# Patient Record
Sex: MALE | Race: WHITE
[De-identification: names, ages, dates, MRNs, and addresses within clinical notes are randomized per-mention and may not be internally consistent; named-entity substitution may affect disease eponyms.]

---

## 2019-08-12 ENCOUNTER — HOSPITAL ENCOUNTER (EMERGENCY)
Dept: HOSPITAL 97 - ER | Age: 29
Discharge: HOME | End: 2019-08-12
Payer: SELF-PAY

## 2019-08-12 DIAGNOSIS — M54.5: ICD-10-CM

## 2019-08-12 DIAGNOSIS — F17.210: ICD-10-CM

## 2019-08-12 DIAGNOSIS — M54.6: Primary | ICD-10-CM

## 2019-08-12 LAB — UA COMPLETE W REFLEX CULTURE PNL UR: (no result)

## 2019-08-12 PROCEDURE — 81015 MICROSCOPIC EXAM OF URINE: CPT

## 2019-08-12 PROCEDURE — 96372 THER/PROPH/DIAG INJ SC/IM: CPT

## 2019-08-12 PROCEDURE — 81003 URINALYSIS AUTO W/O SCOPE: CPT

## 2019-08-12 PROCEDURE — 71046 X-RAY EXAM CHEST 2 VIEWS: CPT

## 2019-08-12 PROCEDURE — 99284 EMERGENCY DEPT VISIT MOD MDM: CPT

## 2019-08-12 NOTE — ER
Nurse's Notes                                                                                     

 [facility]                                                                 

Name: Burt Alex                                                                            

Age: 28 yrs                                                                                       

Sex: Male                                                                                         

: 1990                                                                                   

MRN: [id number]                                                                                   

Arrival Date: 2019                                                                          

Time: 11:45                                                                                       

Account#: [id number]                                                                            

Bed 24                                                                                            

Private MD:                                                                                       

Diagnosis: Pain in thoracic spine                                                                 

                                                                                                  

Presentation:                                                                                     

                                                                                             

11:56 Presenting complaint: Patient states: i have this sever back pain for 5 days now,       hj  

      denies trauma to the area, pain radiates to the leg; pain is 8/10;. Transition of care:     

      patient was not received from another setting of care. Onset of symptoms was 2019. Risk Assessment: Do you want to hurt yourself or someone else? Patient reports no     

      desire to harm self or others. Initial Sepsis Screen: Does the patient meet any 2           

      criteria? No. Patient's initial sepsis screen is negative. Does the patient have a          

      suspected source of infection? No. Patient's initial sepsis screen is negative. Care        

      prior to arrival: None.                                                                     

11:56 Method Of Arrival: Ambulatory                                                             

11:56 Acuity: YULI 4                                                                           hj  

                                                                                                  

Historical:                                                                                       

- Allergies:                                                                                      

11:58 No Known Allergies;                                                                     hj  

- PMHx:                                                                                           

11:58 None;                                                                                   hj  

- PSHx:                                                                                           

11:58 None;                                                                                   hj  

                                                                                                  

- Immunization history:: Adult Immunizations up to date.                                          

- Social history:: Smoking status: Patient uses tobacco products, smokes one-half pack            

  cigarettes per day.                                                                             

- Ebola Screening: : Patient negative for fever greater than or equal to 101.5 degrees            

  Fahrenheit, and additional compatible Ebola Virus Disease symptoms Patient denies               

  exposure to infectious person Patient denies travel to an Ebola-affected area in the            

  21 days before illness onset No symptoms or risks identified at this time.                      

                                                                                                  

                                                                                                  

Screenin:10 Abuse screen: Denies threats or abuse. Denies injuries from another. Nutritional        ca1 

      screening: No deficits noted. Tuberculosis screening: No symptoms or risk factors           

      identified. Fall Risk None identified.                                                      

                                                                                                  

Assessment:                                                                                       

12:10 General: Appears in no apparent distress. comfortable, Behavior is calm, cooperative,   ca1 

      appropriate for age. Pain: Complains of pain in lumbar area and thoracic area Pain          

      radiates to right leg and left leg Pain currently is 8 out of 10 on a pain scale. Pain      

      began 5 days ago. Neuro: Level of Consciousness is awake, alert, obeys commands,            

      Oriented to person, place, time, situation. Cardiovascular: Heart tones S1 S2 present       

      Capillary refill < 3 seconds Patient's skin is warm and dry. Respiratory: Airway is         

      patent Respiratory effort is even, unlabored, Respiratory pattern is regular,               

      symmetrical, Breath sounds are clear bilaterally. GI: Abdomen is round non-distended,       

      Bowel sounds present X 4 quads. Abd is soft and non tender X 4 quads. : No deficits       

      noted. No signs and/or symptoms were reported regarding the genitourinary system. EENT:     

      No deficits noted. No signs and/or symptoms were reported regarding the EENT system.        

      Derm: Skin is intact, is healthy with good turgor, Skin is pink, warm \T\ dry.              

      Musculoskeletal: Circulation, motion, and sensation intact. Capillary refill < 3            

      seconds, Range of motion: intact in all extremities.                                        

13:10 Reassessment: Patient appears in no apparent distress at this time. Patient and/or      ca1 

      family updated on plan of care and expected duration. Pain level reassessed. Patient is     

      alert, oriented x 3, equal unlabored respirations, skin warm/dry/pink.                      

                                                                                                  

Vital Signs:                                                                                      

11:58  / 81; Pulse 82; Resp 18; Temp 97.8(TE); Pulse Ox 100% on R/A; Weight 99.79 kg;   hj  

      Height 6 ft. 2 in. (187.96 cm); Pain 8/10;                                                  

13:10  / 81; Pulse 79; Resp 16 S; Temp 98(O); Pulse Ox 100% on R/A;                     ca1 

11:58 Body Mass Index 28.25 (99.79 kg, 187.96 cm)                                               

                                                                                                  

ED Course:                                                                                        

11:45 Patient arrived in ED.                                                                  rg4 

11:48 Argentina Armenta FNP-C is PHCP.                                                        snw 

11:48 Fortino Pickens MD is Attending Physician.                                                snw 

11:57 Triage completed.                                                                       hj  

11:58 Arm band placed on left wrist.                                                          hj  

12:01 Nica Cervantes, RN is Primary Nurse.                                                      ca1 

12:10 Patient has correct armband on for positive identification. Bed in low position. Call   ca1 

      light in reach. Side rails up X 1.                                                          

12:10 No provider procedures requiring assistance completed.                                  ca1 

12:12 Urine collected: clean catch specimen, clear, mely colored.                            jp3 

12:45 X-ray completed. Patient tolerated procedure well. Patient moved back from radiology.   mh1 

12:48 Chest Pa And Lat (2 Views) XRAY In Process Unspecified.                                 EDMS

13:16 Patient did not have IV access during this emergency room visit.                        ca1 

                                                                                                  

Administered Medications:                                                                         

12:17 Drug: Phenergan 25 mg Route: PO;                                                        ca1 

12:30 Drug: TORadol 30 mg Route: IM; Site: right deltoid;                                     ca1 

                                                                                                  

                                                                                                  

Outcome:                                                                                          

12:54 Discharge ordered by MD.                                                                [id number] 

13:16 Discharged to home ambulatory, with family.                                             ca1 

13:16 Condition: stable                                                                           

13:16 Discharge instructions given to patient, Instructed on discharge instructions, follow       

      up and referral plans. medication usage, Demonstrated understanding of instructions,        

      follow-up care, medications, Prescriptions given X 2.                                       

13:17 Patient left the ED.                                                                    ca1 

                                                                                                  

Signatures:                                                                                       

Dispatcher [employer]                           EDMS                                                 

Argentina Armenta, TARAHP-C                 FNP-Csnw                                                  

Ayanna Steiner                               1                                                  

Wolfgang Vásquez, RN                      RN   Monica Puri                                 rg4                                                  

Carmelo Tinoco                              3                                                  

Nica Cervantes RN                        RN   ca1                                                  

                                                                                                  

Corrections: (The following items were deleted from the chart)                                    

12:00 11:58 Pulse 82bpm; Resp 18bpm; Pulse Ox 100% RA; Temp 97.8F Temporal; 99.79 kg; Height  hj  

      6 ft. 2 in.; BMI: 28.2; Pain 8/10; hj                                                       

                                                                                                  

**************************************************************************************************

## 2019-08-12 NOTE — RAD REPORT
EXAM DESCRIPTION:  RAD - Chest Pa And Lat (2 Views) - 8/12/2019 12:46 pm

 

CLINICAL HISTORY:  Wheezing, cough, chest pain, back pain

 

COMPARISON:  None.

 

TECHNIQUE:  PA and lateral views of the chest were obtained.

 

FINDINGS:  The lungs are clear.   Heart size is normal and central vasculature is within normal limit
s.  No pleural effusion or pneumothorax seen.  No acute bony finding noted.  No aortic abnormality.  
No free air in the diaphragm.

 

IMPRESSION:  No acute cardiopulmonary process.

## 2019-08-12 NOTE — EDPHYS
Physician Documentation                                                                           

 [facility]                                                                 

Name: Burt Alex                                                                            

Age: 28 yrs                                                                                       

Sex: Male                                                                                         

: 1990                                                                                   

MRN: [id number]                                                                                   

Arrival Date: 2019                                                                          

Time: 11:45                                                                                       

Account#: [id number]                                                                            

Bed 24                                                                                            

Private MD:                                                                                       

ED Physician Fortino Pickens                                                                         

HPI:                                                                                              

                                                                                             

12:09 This 28 yrs old  Male presents to ER via Ambulatory with complaints of Back    snw 

      Pain.                                                                                       

12:09 The patient presents with pain that is acute, with no known mechanism of injury. The    snw 

      symptoms are located in the thoracic spine and lumbar spine. Onset: The                     

      symptoms/episode began/occurred 5 day(s) ago. The pain radiates to the right gluteus        

      dayana. Associated signs and symptoms: The patient has no apparent associated signs or     

      symptoms. The problem was sustained from unknown cause. Severity of symptoms: At their      

      worst the symptoms were mild. The patient has not experienced similar symptoms in the       

      past, but family has similar symptoms, Mom and Sibling dx with spinal stenosis and pt       

      concerned for this. The patient has not recently seen a physician.                          

                                                                                                  

Historical:                                                                                       

- Allergies:                                                                                      

11:58 No Known Allergies;                                                                     hj  

- PMHx:                                                                                           

11:58 None;                                                                                   hj  

- PSHx:                                                                                           

11:58 None;                                                                                   hj  

                                                                                                  

- Immunization history:: Adult Immunizations up to date.                                          

- Social history:: Smoking status: Patient uses tobacco products, smokes one-half pack            

  cigarettes per day.                                                                             

- Ebola Screening: : Patient negative for fever greater than or equal to 101.5 degrees            

  Fahrenheit, and additional compatible Ebola Virus Disease symptoms Patient denies               

  exposure to infectious person Patient denies travel to an Ebola-affected area in the            

  21 days before illness onset No symptoms or risks identified at this time.                      

                                                                                                  

                                                                                                  

ROS:                                                                                              

12:09 Constitutional: Negative for fever, chills, and weight loss, Eyes: Negative for injury, snw 

      pain, redness, and discharge, ENT: Negative for injury, pain, and discharge, Neck:          

      Negative for injury, pain, and swelling, Cardiovascular: Negative for chest pain,           

      palpitations, and edema, Respiratory: Negative for shortness of breath, cough,              

      wheezing, and pleuritic chest pain, Abdomen/GI: Negative for abdominal pain, nausea,        

      vomiting, diarrhea, and constipation, : Negative for injury, bleeding, discharge, and     

      swelling, MS/Extremity: Negative for injury and deformity, Skin: Negative for injury,       

      rash, and discoloration, Neuro: Negative for headache, weakness, numbness, tingling,        

      and seizure.                                                                                

12:09 Back: Positive for pain at rest, pain with movement, of the thoracic area and lumbar        

      area.                                                                                       

                                                                                                  

Exam:                                                                                             

12:07 Head/Face:  Normocephalic, atraumatic. Eyes:  Pupils equal round and reactive to light, snw 

      extra-ocular motions intact.  Lids and lashes normal.  Conjunctiva and sclera are           

      non-icteric and not injected.  Cornea within normal limits.  Periorbital areas with no      

      swelling, redness, or edema. ENT:  Nares patent. No nasal discharge, no septal              

      abnormalities noted.  Tympanic membranes are normal and external auditory canals are        

      clear.  Oropharynx with no redness, swelling, or masses, exudates, or evidence of           

      obstruction, uvula midline.  Mucous membranes moist. Neck:  Trachea midline, no             

      thyromegaly or masses palpated, and no cervical lymphadenopathy.  Supple, full range of     

      motion without nuchal rigidity, or vertebral point tenderness.  No Meningismus.             

      Chest/axilla:  Normal chest wall appearance and motion.  Nontender with no deformity.       

      No lesions are appreciated. Cardiovascular:  Regular rate and rhythm with a normal S1       

      and S2.  No gallops, murmurs, or rubs.  Normal PMI, no JVD.  No pulse deficits.             

      Abdomen/GI:  Soft, non-tender, with normal bowel sounds.  No distension or tympany.  No     

      guarding or rebound.  No evidence of tenderness throughout. Skin:  Warm, dry with           

      normal turgor.  Normal color with no rashes, no lesions, and no evidence of cellulitis.     

      MS/ Extremity:  Pulses equal, no cyanosis.  Neurovascular intact.  Full, normal range       

      of motion. Neuro:  Awake and alert, GCS 15, oriented to person, place, time, and            

      situation.  Cranial nerves II-XII grossly intact.  Motor strength 5/5 in all                

      extremities.  Sensory grossly intact.  Cerebellar exam normal.  Normal gait. Psych:         

      Awake, alert, with orientation to person, place and time.  Behavior, mood, and affect       

      are within normal limits.                                                                   

12:07 Constitutional: The patient appears alert, awake, anxious, uncomfortable.                   

12:07 Respiratory: the patient does not display signs of respiratory distress,  Respirations:     

      shallow respirations, Breath sounds: wheezing: that is moderate, right greater than         

      left.                                                                                       

12:07 Back: pain, that is moderate, of the  thoracic area and lumbar area, ROM is painful,        

      CVA tenderness, is absent.                                                                  

                                                                                                  

Vital Signs:                                                                                      

11:58  / 81; Pulse 82; Resp 18; Temp 97.8(TE); Pulse Ox 100% on R/A; Weight 99.79 kg;   hj  

      Height 6 ft. 2 in. (187.96 cm); Pain 8/10;                                                  

13:10  / 81; Pulse 79; Resp 16 S; Temp 98(O); Pulse Ox 100% on R/A;                     ca1 

11:58 Body Mass Index 28.25 (99.79 kg, 187.96 cm)                                             hj  

                                                                                                  

MDM:                                                                                              

12:02 Patient medically screened.                                                             snw 

12:08 Data reviewed: vital signs, nurses notes. Data interpreted: Pulse oximetry: on room air snw 

      is 100 %. Interpretation: normal. Counseling: I had a detailed discussion with the          

      patient and/or guardian regarding: the historical points, exam findings, and any            

      diagnostic results supporting the discharge/admit diagnosis, the presence of at least       

      one elevated blood pressure reading (>120/80) during this emergency department visit.       

                                                                                                  

                                                                                             

12:01 Order name: Urine Microscopic Only; Complete Time: 12:36                                snw 

                                                                                             

12:23 Order name: Urine Dipstick--Ancillary (enter results)                                   bd  

                                                                                             

12:01 Order name: Urine Dipstick-Ancillary (obtain specimen); Complete Time: 12:17            snw 

                                                                                             

12:07 Order name: Chest Pa And Lat (2 Views) XRAY; Complete Time: 12:53                       snw 

                                                                                                  

Administered Medications:                                                                         

12:17 Drug: Phenergan 25 mg Route: PO;                                                        ca1 

12:30 Drug: TORadol 30 mg Route: IM; Site: right deltoid;                                     ca1 

                                                                                                  

                                                                                                  

Disposition:                                                                                      

15:22 Co-signature as Attending Physician, Fortino Pickens MD.                                    rn  

                                                                                                  

Disposition:                                                                                      

19 12:54 Discharged to Home. Impression: Pain in thoracic spine.                            

- Condition is Stable.                                                                            

- Discharge Instructions: Back Pain, Adult, Hypertension, Steps to Quit Smoking,                  

  Smoking Hazards, Thoracic Strain.                                                               

- Prescriptions for Mobic 7.5 mg Oral Tablet - take 1 tablet by ORAL route once daily             

  take with food; 20 tablet. promethazine 25 mg Oral Tablet - take 1 tablet by ORAL               

  route every 6 hours As needed; 20 tablet.                                                       

- Medication Reconciliation Form, Thank You Letter, Antibiotic Education, Prescription            

  Opioid Use form.                                                                                

- Follow up: Private Physician; When: 1 week; Reason: Recheck today's complaints,                 

  Continuance of care, Re-evaluation by your physician. Follow up: Emergency                      

  Department; When: As needed; Reason: Worsening of condition.                                    

                                                                                                  

                                                                                                  

                                                                                                  

Signatures:                                                                                       

Dispatcher [employer]                           EDMS                                                 

Kathi Argnetina, FNP-C                 FNP-Csnw                                                  

Fortino Pickens MD MD rn Joaquin, Henry, RN RN                                                      

iNca Cervantes RN RN   ca1                                                  

                                                                                                  

Corrections: (The following items were deleted from the chart)                                    

13:17 12:54 2019 12:54 Discharged to Home. Impression: Pain in thoracic spine.          ca1 

      Condition is Stable. Forms are Medication Reconciliation Form, Thank You Letter,            

      Antibiotic Education, Prescription Opioid Use. Follow up: Private Physician; When: 1        

      week; Reason: Recheck today's complaints, Continuance of care, Re-evaluation by your        

      physician. Follow up: Emergency Department; When: As needed; Reason: Worsening of           

      condition. snw                                                                              

                                                                                                  

**************************************************************************************************

## 2019-09-12 ENCOUNTER — HOSPITAL ENCOUNTER (EMERGENCY)
Dept: HOSPITAL 97 - ER | Age: 29
Discharge: HOME | End: 2019-09-12
Payer: SELF-PAY

## 2019-09-12 VITALS — DIASTOLIC BLOOD PRESSURE: 93 MMHG | SYSTOLIC BLOOD PRESSURE: 146 MMHG

## 2019-09-12 VITALS — OXYGEN SATURATION: 97 % | TEMPERATURE: 97.4 F

## 2019-09-12 DIAGNOSIS — R60.9: Primary | ICD-10-CM

## 2019-09-12 DIAGNOSIS — F17.210: ICD-10-CM

## 2019-09-12 PROCEDURE — 99283 EMERGENCY DEPT VISIT LOW MDM: CPT

## 2019-09-12 NOTE — ER
Nurse's Notes                                                                                     

 [facility]                                                                 

Name: Burt Alex                                                                            

Age: 28 yrs                                                                                       

Sex: Male                                                                                         

: 1990                                                                                   

MRN: [id number]                                                                                   

Arrival Date: 2019                                                                          

Time: 18:56                                                                                       

Account#: [id number]                                                                            

Bed 18                                                                                            

Private MD:                                                                                       

Diagnosis: Pedal Edema                                                                            

                                                                                                  

Presentation:                                                                                     

                                                                                             

19:10 Presenting complaint: Patient states: bilateral foot swelling since last night. pt      ak1 

      stated he has had this happen before and he rest, elevates and ice them. pt told his        

      boss, boss stated he must have doctor note to take days off and return to work.             

      Transition of care: patient was not received from another setting of care. Onset of         

      symptoms was 2019. Risk Assessment: Do you want to hurt yourself or           

      someone else? Patient reports no desire to harm self or others. Initial Sepsis Screen:      

      Does the patient meet any 2 criteria? No. Patient's initial sepsis screen is negative.      

      Does the patient have a suspected source of infection?. Care prior to arrival: None.        

19:10 Acuity: YULI 3                                                                           ak1 

19:10 Method Of Arrival: Ambulatory                                                           ak1 

                                                                                                  

Triage Assessment:                                                                                

19:12 General: Appears in no apparent distress. Behavior is calm, cooperative.                ak1 

                                                                                                  

Historical:                                                                                       

- Allergies:                                                                                      

19:12 No Known Allergies;                                                                     ak1 

- Home Meds:                                                                                      

19:12 None [Active];                                                                          ak1 

- PMHx:                                                                                           

19:12 None;                                                                                   ak1 

- PSHx:                                                                                           

19:12 None;                                                                                   ak1 

                                                                                                  

- Immunization history:: Adult Immunizations unknown.                                             

- Social history:: Smoking status: Patient uses tobacco products, smokes two packs                

  cigarettes per day.                                                                             

- Ebola Screening: : No symptoms or risks identified at this time.                                

                                                                                                  

                                                                                                  

Screenin:20 Abuse screen: Denies threats or abuse. Denies injuries from another. Nutritional        aa1 

      screening: No deficits noted. Tuberculosis screening: No symptoms or risk factors           

      identified. Fall Risk None identified.                                                      

                                                                                                  

Assessment:                                                                                       

19:20 General: Appears in no apparent distress. comfortable, Behavior is calm, cooperative,   aa1 

      appropriate for age. Pain: Complains of pain in right foot and left foot Quality of         

      pain is described as aching. Neuro: Level of Consciousness is awake, alert, obeys           

      commands, Oriented to person, place, time, situation, Moves all extremities. Full           

      function Gait is steady. Cardiovascular: Denies chest pain, palpitations, shortness of      

      breath, Heart tones S1 S2 present Rhythm is regular. Respiratory: Airway is patent          

      Respiratory effort is even, unlabored, Respiratory pattern is regular, symmetrical. GI:     

      No signs and/or symptoms were reported involving the gastrointestinal system. : No        

      signs and/or symptoms were reported regarding the genitourinary system. EENT: No signs      

      and/or symptoms were reported regarding the EENT system. Derm: Skin is intact, is           

      healthy with good turgor, Skin is pink, warm \T\ dry. Musculoskeletal: Circulation,         

      motion, and sensation intact. Capillary refill < 3 seconds, Swelling present in right       

      foot and left foot.                                                                         

20:18 Reassessment: Patient appears in no apparent distress at this time. Patient is alert,   aa1 

      oriented x 3, equal unlabored respirations, skin warm/dry/pink. Discussed d/c \T\ f/u       

      instructions with pt; denies questions or concerns at this time. Ambulatory to lobby        

      with steady gait.                                                                           

                                                                                                  

Vital Signs:                                                                                      

19:10  / 89; Pulse 86; Resp 18; Temp 97.4; Pulse Ox 97% on R/A; Weight 108.86 kg (R);   ak1 

      Height 6 ft. 1 in. (185.42 cm) (R); Pain 2/10;                                              

19:57  / 93; Pulse 74; Resp 18; Pulse Ox 97% on R/A; Pain 2/10;                         aa1 

20:18  / 81; Pulse 75; Resp 18; Temp 97.6; Pulse Ox 98% on R/A; Pain 2/10;              aa1 

19:10 Body Mass Index 31.66 (108.86 kg, 185.42 cm)                                            ak1 

                                                                                                  

ED Course:                                                                                        

18:56 Patient arrived in ED.                                                                  rg4 

19:10 Arm band placed on Patient placed in waiting room, Patient notified of wait time.       ak1 

19:12 Triage completed.                                                                       ak1 

19:20 Patient has correct armband on for positive identification. Placed in gown. Bed in low  aa1 

      position. Call light in reach. Pulse ox on. NIBP on.                                        

19:54 Coni Macdonald RN is Primary Nurse.                                                aa1 

19:55 Nathaniel Mercedes PA is PHCP.                                                               jr8 

19:55 Jonathan Jj MD is Attending Physician.                                             jr8 

20:18 No provider procedures requiring assistance completed. Patient did not have IV access   aa1 

      during this emergency room visit.                                                           

                                                                                                  

Administered Medications:                                                                         

No medications were administered                                                                  

                                                                                                  

                                                                                                  

Outcome:                                                                                          

20:10 Discharge ordered by MD.                                                                monisha 

20:18 Discharged to home ambulatory.                                                          aa1 

20:18 Condition: good                                                                             

20:18 Discharge instructions given to patient, Instructed on discharge instructions, follow       

      up and referral plans. Demonstrated understanding of instructions, follow-up care.          

20:20 Patient left the ED.                                                                    aa1 

                                                                                                  

Signatures:                                                                                       

Coni Macdonald RN                  RN   aa1                                                  

Nathaniel Mercedes PA PA   jr8                                                  

Priyanka Nayak RN                       RN   ak1                                                  

Monica Moore                                 rg4                                                  

                                                                                                  

**************************************************************************************************

## 2019-09-12 NOTE — EDPHYS
Physician Documentation                                                                           

 [facility]                                                                 

Name: Burt Alex                                                                            

Age: 28 yrs                                                                                       

Sex: Male                                                                                         

: 1990                                                                                   

MRN: [id number]                                                                                   

Arrival Date: 2019                                                                          

Time: 18:56                                                                                       

Account#: [id number]                                                                            

Bed 18                                                                                            

Private MD:                                                                                       

ED Physician Jonathan Jj                                                                      

HPI:                                                                                              

                                                                                             

20:07 This 28 yrs old  Male presents to ER via Ambulatory with complaints of Foot    jr8 

      Pain, Swelling Of Feet.                                                                     

20:07 Onset: The symptoms/episode began/occurred acutely, yesterday. Modifying factors: The   jr8 

      symptoms are alleviated by elevating leg, OTC meds, the symptoms are aggravated by          

      weight bearing. Associated signs and symptoms: The patient has no apparent associated       

      signs or symptoms. Severity of symptoms: At their worst the symptoms were mild, in the      

      emergency department the symptoms are unchanged. The patient has experienced similar        

      episodes in the past, a few times. The patient has not recently seen a physician.           

      Patient stated that he works in [employer] and is on his feet all day. Occasionally has       

      swelling in feet. Could not fit into shoes. Tried to go to work to get a couple of days     

      off but required him to have work note. Denies any other symptoms.                          

                                                                                                  

Historical:                                                                                       

- Allergies:                                                                                      

19:12 No Known Allergies;                                                                     ak1 

- Home Meds:                                                                                      

19:12 None [Active];                                                                          ak1 

- PMHx:                                                                                           

19:12 None;                                                                                   ak1 

- PSHx:                                                                                           

19:12 None;                                                                                   ak1 

                                                                                                  

- Immunization history:: Adult Immunizations unknown.                                             

- Social history:: Smoking status: Patient uses tobacco products, smokes two packs                

  cigarettes per day.                                                                             

- Ebola Screening: : No symptoms or risks identified at this time.                                

                                                                                                  

                                                                                                  

ROS:                                                                                              

20:07 Eyes: Negative for injury, pain, redness, and discharge, ENT: Negative for injury,      jr8 

      pain, and discharge, Neck: Negative for injury, pain, and swelling, Cardiovascular:         

      Negative for chest pain, palpitations. Positive for pedal edema  Respiratory: Negative      

      for shortness of breath, cough, wheezing, and pleuritic chest pain, Abdomen/GI:             

      Negative for abdominal pain, nausea, vomiting, diarrhea, and constipation, Back:            

      Negative for injury and pain, MS/Extremity: Negative for injury and deformity, Skin:        

      Negative for injury, rash, and discoloration, Neuro: Negative for headache, weakness,       

      numbness, tingling, and seizure.                                                            

                                                                                                  

Exam:                                                                                             

20:07 Eyes:  Pupils equal round and reactive to light, extra-ocular motions intact.  Lids and jr8 

      lashes normal.  Conjunctiva and sclera are non-icteric and not injected.  Cornea within     

      normal limits.  Periorbital areas with no swelling, redness, or edema. ENT:  Nares          

      patent. No nasal discharge, no septal abnormalities noted.  Tympanic membranes are          

      normal and external auditory canals are clear.  Oropharynx with no redness, swelling,       

      or masses, exudates, or evidence of obstruction, uvula midline.  Mucous membranes           

      moist. Neck:  Trachea midline, no thyromegaly or masses palpated, and no cervical           

      lymphadenopathy.  Supple, full range of motion without nuchal rigidity, or vertebral        

      point tenderness.  No Meningismus. Respiratory:  Lungs have equal breath sounds             

      bilaterally, clear to auscultation and percussion.  No rales, rhonchi or wheezes noted.     

       No increased work of breathing, no retractions or nasal flaring. Abdomen/GI:  Soft,        

      non-tender, with normal bowel sounds.  No distension or tympany.  No guarding or            

      rebound.  No evidence of tenderness throughout. Back:  No spinal tenderness.  No            

      costovertebral tenderness.  Full range of motion. Skin:  Warm, dry with normal turgor.      

      Normal color with no rashes, no lesions, and no evidence of cellulitis. MS/ Extremity:      

      Pulses equal, no cyanosis.  Neurovascular intact.  Full, normal range of motion. Neuro:     

       Awake and alert, GCS 15, oriented to person, place, time, and situation.  Cranial          

      nerves II-XII grossly intact.  Motor strength 5/5 in all extremities.  Sensory grossly      

      intact.  Cerebellar exam normal.  Normal gait.                                              

20:07 Cardiovascular: Rate: normal, Rhythm: regular, Pulses: Pulses are 2+ in right radial        

      artery and left radial artery. Heart sounds: normal, normal S1and S2, no S3 or S4, no       

      murmur, no rub, no gallop, Edema: pedal edema, that is moderate, JVD: is not                

      appreciated.                                                                                

                                                                                                  

Vital Signs:                                                                                      

19:10  / 89; Pulse 86; Resp 18; Temp 97.4; Pulse Ox 97% on R/A; Weight 108.86 kg (R);   ak1 

      Height 6 ft. 1 in. (185.42 cm) (R); Pain 2/10;                                              

19:57  / 93; Pulse 74; Resp 18; Pulse Ox 97% on R/A; Pain 2/10;                         aa1 

20:18  / 81; Pulse 75; Resp 18; Temp 97.6; Pulse Ox 98% on R/A; Pain 2/10;              aa1 

19:10 Body Mass Index 31.66 (108.86 kg, 185.42 cm)                                            ak1 

                                                                                                  

MDM:                                                                                              

19:56 Patient medically screened.                                                             jr8 

20:07 Data reviewed: vital signs, nurses notes. Data interpreted: Pulse oximetry: on room air jr8 

      is 97 %. Interpretation: normal. Counseling: I had a detailed discussion with the           

      patient and/or guardian regarding: the historical points, exam findings, and any            

      diagnostic results supporting the discharge/admit diagnosis, the need for outpatient        

      follow up, a family practitioner, to return to the emergency department if symptoms         

      worsen or persist or if there are any questions or concerns that arise at home. ED          

      course: Recommended compression stockings which patient stated that he would get .          

                                                                                                  

Administered Medications:                                                                         

No medications were administered                                                                  

                                                                                                  

                                                                                                  

Disposition:                                                                                      

19 20:10 Discharged to Home. Impression: Pedal Edema.                                       

- Condition is Stable.                                                                            

- Discharge Instructions: Venous Stasis or Chronic Venous Insufficiency, Form - Return            

  To Work, Peripheral Edema.                                                                      

                                                                                                  

- Medication Reconciliation Form, Thank You Letter, Antibiotic Education, Prescription            

  Opioid Use, Work release form form.                                                             

- Follow up: Private Physician; When: As needed; Reason: Recheck today's complaints,              

  Continuance of care, Re-evaluation by your physician.                                           

                                                                                                  

                                                                                                  

                                                                                                  

Addendum:                                                                                         

2019                                                                                        

     08:23 Co-signature as Attending Physician, Jonathan Jj MD I agree with the assessment and  c
ha

           plan of care.                                                                          

                                                                                                  

Signatures:                                                                                       

Coni Macdonald, RN                  RN   aa1                                                  

Jonathan Jj MD MD cha Roszak, Josh, PA                        PA   jr8                                                  

Priyanka Nayak, RN                       RN   ak1                                                  

                                                                                                  

Corrections: (The following items were deleted from the chart)                                    

                                                                                             

20:20 20:10 2019 20:10 Discharged to Home. Impression: Pedal Edema. Condition is        aa1 

      Stable. Forms are Medication Reconciliation Form, Thank You Letter, Antibiotic              

      Education, Prescription Opioid Use. Follow up: Private Physician; When: As needed;          

      Reason: Recheck today's complaints, Continuance of care, Re-evaluation by your              

      physician. jr8                                                                              

                                                                                                  

**************************************************************************************************